# Patient Record
Sex: FEMALE | Race: WHITE | URBAN - NONMETROPOLITAN AREA
[De-identification: names, ages, dates, MRNs, and addresses within clinical notes are randomized per-mention and may not be internally consistent; named-entity substitution may affect disease eponyms.]

---

## 2016-07-12 PROBLEM — H25.813: Noted: 2019-04-26

## 2016-07-12 PROBLEM — Z01.818: Noted: 2019-06-26

## 2016-07-12 PROBLEM — I10: Noted: 2019-06-26

## 2017-11-15 NOTE — PROCEDURE NOTE: SURGICAL
Prior to commencing surgery patient identification, surgical procedure, site, and side were confirmed by . Following topical proparacaine anesthesia, the patient was positioned at the YAG laser, a contact lens coupled to the cornea with methylcellulose and an axial posterior capsulotomy performed without complication using 2.7 Mj x 27 Excess methylcellulose was washed from the eye, one drop of Alphagan was instilled and the patient returned to the holding area having tolerated the procedure well and without complication. <br />

## 2017-11-22 NOTE — PROCEDURE NOTE: SURGICAL
Prior to commencing surgery patient identification, surgical procedure, site, and side were confirmed by Dr. Karie Nielsen. Following topical proparacaine anesthesia, the patient was positioned at the YAG laser, a contact lens coupled to the cornea with methylcellulose and an axial posterior capsulotomy performed without complication using 3.0 Mj x 38. Excess methylcellulose was washed from the eye, one drop of Alphagan was instilled and the patient returned to the holding area having tolerated the procedure well and without complication. <br />

## 2019-04-26 ENCOUNTER — IMPORTED ENCOUNTER (OUTPATIENT)
Dept: URBAN - NONMETROPOLITAN AREA CLINIC 1 | Facility: CLINIC | Age: 69
End: 2019-04-26

## 2019-04-26 PROCEDURE — 92014 COMPRE OPH EXAM EST PT 1/>: CPT

## 2019-04-26 NOTE — PATIENT DISCUSSION
Cataract OU-Visually significant cataract OU.-Cataract(s) causing symptomatic impairment of visual function not correctable with a tolerable change in glasses or contact lenses lighting or non-operative means resulting in specific activity limitations and/or participation restrictions including but not limited to reading viewing television driving or meeting vocational or recreational needs. -Expectation is clearer vision and functional improvement in symptoms as well as reduced glare disability after cataract removal.-Order IOLMaster and OPD today. -Recommend standard/traditional based on today's OPD testing and lifestyle questionnaire.-All questions were answered regarding surgery including pre and post-op medications appointments activity restrictions and anesthetic usage.-The risks benefits and alternatives and special risk factors for the patient were discussed in detail including but not limited to: bleeding infection retinal detachment vitreous loss problems with the implant and possible need for additional surgery.-Although rare the possibility of complete vision loss was discussed.-The possible need for glasses post-operatively was discussed.-Order medical clearance exam based on history of hypertension and diabetes. -Patient elects to proceed with cataract surgery O . Will schedule at patient's convenience and re-evaluate O  in the future. *****Patient elcts standard/traditional OU starting with OD****Recommend Comlpex/Trypan and extra Provisc

## 2019-06-05 ENCOUNTER — IMPORTED ENCOUNTER (OUTPATIENT)
Dept: URBAN - NONMETROPOLITAN AREA CLINIC 1 | Facility: CLINIC | Age: 69
End: 2019-06-05

## 2019-06-05 PROBLEM — H25.813: Noted: 2019-06-05

## 2019-06-05 PROBLEM — H52.4: Noted: 2019-06-05

## 2019-06-05 PROBLEM — E11.9: Noted: 2019-06-05

## 2019-06-26 ENCOUNTER — IMPORTED ENCOUNTER (OUTPATIENT)
Dept: URBAN - NONMETROPOLITAN AREA CLINIC 1 | Facility: CLINIC | Age: 69
End: 2019-06-26

## 2019-07-19 ENCOUNTER — IMPORTED ENCOUNTER (OUTPATIENT)
Dept: URBAN - NONMETROPOLITAN AREA CLINIC 1 | Facility: CLINIC | Age: 69
End: 2019-07-19

## 2019-07-19 PROBLEM — Z98.41: Noted: 2019-07-19

## 2019-07-19 PROBLEM — H25.812: Noted: 2019-07-19

## 2019-07-19 PROCEDURE — 99024 POSTOP FOLLOW-UP VISIT: CPT

## 2019-07-19 PROCEDURE — 92012 INTRM OPH EXAM EST PATIENT: CPT

## 2019-07-19 NOTE — PATIENT DISCUSSION
Cataract(s)-Visually significant cataract OS . -Cataract(s) causing symptomatic impairment of visual function not correctable with a tolerable change in glasses or contact lenses lighting or non-operative means resulting in specific activity limitations and/or participation restrictions including but not limited to reading viewing television driving or meeting vocational or recreational needs. -Expectation is clearer vision and functional improvement in symptoms as well as reduced glare disability after cataract removal.-Recommend Standard/.Traditionals based on previous OPD testing and lifestyle questionnaire.-All questions were answered regarding surgery including pre and post-op medications appointments activity restrictions and anesthetic usage.-The risks benefits and alternatives and special risk factors for the patient were discussed in detail including but not limited to: bleeding infection retinal detachment vitreous loss problems with the implant and possible need for additional surgery.-Although rare the possibility of complete vision loss was discussed.-The need for glasses post-operatively was discussed.-Patient elects to proceed with cataract surgery OS . Will schedule at patient's convenience. s/p PCIOL CE OD 7/18/19 -Pt doing well s/p PCIOL. -Continue post-op gtts according to instruction sheet and sleep with eye shield over eye for 7 nights.-Avoid bending at the waist lifting anything over 5lbs and dirty or carol environments. -RTC .

## 2019-08-01 ENCOUNTER — IMPORTED ENCOUNTER (OUTPATIENT)
Dept: URBAN - NONMETROPOLITAN AREA CLINIC 1 | Facility: CLINIC | Age: 69
End: 2019-08-01

## 2019-08-01 PROBLEM — Z98.42: Noted: 2019-08-01

## 2019-08-01 PROCEDURE — 99024 POSTOP FOLLOW-UP VISIT: CPT

## 2019-08-01 PROCEDURE — 92136 OPHTHALMIC BIOMETRY: CPT

## 2019-08-01 PROCEDURE — 66982 XCAPSL CTRC RMVL CPLX WO ECP: CPT

## 2019-08-01 NOTE — PATIENT DISCUSSION
Same day s/p PCIOL OS-Pt doing well s/p PCIOL. -Continue post-op gtts according to instruction sheet and sleep with eye shield over eye for 7 nights.-Avoid bending at the waist lifting anything over 5lbs and dirty or carol environments.

## 2019-10-03 NOTE — PATIENT DISCUSSION
Bedside shift change report given to [address] [place] [address] (oncoming nurse) by Gaston Gasca (offgoing nurse). Report included the following information SBAR and Kardex. Discussed the risks/benefits of laser capsulotomy. Laser recommended. Patient elects to proceed.

## 2021-06-18 NOTE — PATIENT DISCUSSION
Instructed to call immediately if any new distortion, blurring, decreased vision or eye pain. negative...

## 2022-03-04 NOTE — PATIENT DISCUSSION
Discussed condition and exacerbating conditions/situations (e.g., dry/arid environments, overhead fans, air conditioners, side effect of medications). No assistance needed

## 2022-04-15 ASSESSMENT — VISUAL ACUITY
OD_PAM: 20/70
OS_GLARE: 20/60
OS_AM: 20/25-
OD_PH: 20/80
OD_CC: 20/200
OD_CC: 20/200
OS_CC: 20/80
OS_CC: 20/60
OS_PH: 20/40
OD_CC: 20/200
OD_PAM: 20/70
OD_PH: 20/80
OD_PH: 20/80
OD_CC: 20/30
OS_PH: 20/40
OS_CC: 20/150-
OS_PH: 20/40
OS_AM: 20/25-
OS_GLARE: 20/60
OS_CC: 20/60

## 2022-04-15 ASSESSMENT — TONOMETRY
OS_IOP_MMHG: 12
OS_IOP_MMHG: 14
OD_IOP_MMHG: 14
OS_IOP_MMHG: 17